# Patient Record
Sex: FEMALE | Race: WHITE
[De-identification: names, ages, dates, MRNs, and addresses within clinical notes are randomized per-mention and may not be internally consistent; named-entity substitution may affect disease eponyms.]

---

## 2017-03-16 ENCOUNTER — HOSPITAL ENCOUNTER (OUTPATIENT)
Dept: HOSPITAL 39 - GMAM | Age: 77
Discharge: HOME | End: 2017-03-16
Attending: FAMILY MEDICINE
Payer: MEDICARE

## 2017-03-16 DIAGNOSIS — E53.8: Primary | ICD-10-CM

## 2018-08-31 ENCOUNTER — HOSPITAL ENCOUNTER (OUTPATIENT)
Dept: HOSPITAL 39 - GMAM | Age: 78
End: 2018-08-31
Attending: FAMILY MEDICINE
Payer: MEDICARE

## 2018-08-31 DIAGNOSIS — E53.8: Primary | ICD-10-CM

## 2018-09-10 ENCOUNTER — HOSPITAL ENCOUNTER (OUTPATIENT)
Dept: HOSPITAL 39 - MAMMO | Age: 78
End: 2018-09-10
Attending: FAMILY MEDICINE
Payer: MEDICARE

## 2018-09-10 DIAGNOSIS — Z12.31: Primary | ICD-10-CM

## 2018-09-11 NOTE — MAM
EXAM DESCRIPTION: 

3D Screening BILATERAL : Digital Mammography.



CLINICAL HISTORY: 

78 years Female SCREENING . No personal history or family history

of breast cancer. Childbirth. Postmenopausal. No HRT.  Lifetime

risk of developing breast cancer (Tyrer-Cuzick model) is 2.8 %.



COMPARISON: 

2-D digital screening bilateral study 10/3/2016.  



TECHNIQUE: 

Bilateral CC and MLO projection full-field images, Digital

tomosynthesis mammographic technique.  Bilateral digital 2-D

full-field MLO images.  CAD not utilized. 



FINDINGS: 

The breast parenchymal density pattern is:  Scattered areas of

fibroglandular density.  No skin thickening or nipple retraction.

 Bilateral axillary lymph nodes. Bilateral solitary

microcalcifications. Skin calcifications on the medial right

breast posteriorly. Secretory calcifications right breast.

No new focal, stellate mass or density, focal asymmetry , and no

suspicious microcalcifications bilaterally. Stable mammograms

compared to prior study.  Taking into account, differences in

mammographic technique.



IMPRESSION: 

Benign exam.



BIRAD CATEGORY: 2 BENIGN FINDINGS.



RECOMMENDATIONS:

FOLLOW UP: Routine digital bilateral  screening, one year

interval from  September 2018.



Written communication explaining the IMPRESSION and follow-up,

will be mailed to the patient and referring health care provider.



According to the [employer] College [employer], yearly mammograms

are recommended starting at age 40 and continuing as long as a

woman is in good health.  Any breast change noted on a breast

self-exam should be reported promptly to the patient's healthcare

provider.  Breast MRI is recommended for women with an

approximately 20-25% or greater lifetime risk of breast cancer,

including women with a strong family history of breast or ovarian

cancer and women who have been treated for Hodgkin's disease.



A negative mammographic report should not delay tissue diagnosis

in patients with significant clinical history or physical

findings.



Extremely dense breast tissue limits the sensitivity of digital

mammography. 



Electronically signed by:  Drew Sargent MD  9/11/2018 9:18 AM CDT

Workstation: [id number]

## 2018-11-12 ENCOUNTER — HOSPITAL ENCOUNTER (OUTPATIENT)
Dept: HOSPITAL 39 - US | Age: 78
End: 2018-11-12
Attending: FAMILY MEDICINE
Payer: MEDICARE

## 2018-11-12 DIAGNOSIS — R10.11: Primary | ICD-10-CM

## 2018-11-12 NOTE — US
EXAM DESCRIPTION: Gall Bladder



CLINICAL HISTORY: ABDOMINAL PAIN



COMPARISON: None Available.



TECHNIQUE: Right upper quadrant ultrasound



FINDINGS:



Pancreas: Visualized portions of the pancreas are unremarkable.

Bowel gas obscures some areas.



Aorta/inferior vena cava: No aortic aneurysm. Normal inferior

vena cava.



Liver: The liver is homogeneous in texture with normal

echogenicity of the hepatic parenchyma. No focal liver lesion or

intrahepatic bile duct dilatation. No liver surface irregularity.

Normal appearance of the portal vein and hepatic veins.



Gallbladder: Gallbladder appears normal  with no intraluminal

stones or wall thickening. 



Common bile duct: Normal caliber measuring 2.6 mm.



Right kidney: Renal length is 11.4 cm. There is mild cortical

thinning but normal echogenicity. No hydronephrosis is seen. No

renal mass or shadowing calculus. Small cyst at the upper pole

the right kidney measures 1.3 cm.



IMPRESSION:



No diagnostic abnormality is identified on sonographic

examination of the right upper quadrant.







Electronically signed by:  Tommy Santoyo MD  11/12/2018 8:53

AM CST Workstation: [id number]

## 2018-12-10 ENCOUNTER — HOSPITAL ENCOUNTER (OUTPATIENT)
Dept: HOSPITAL 39 - GMAM | Age: 78
End: 2018-12-10
Attending: FAMILY MEDICINE
Payer: MEDICARE

## 2018-12-10 DIAGNOSIS — E53.8: Primary | ICD-10-CM

## 2019-02-26 ENCOUNTER — HOSPITAL ENCOUNTER (EMERGENCY)
Dept: HOSPITAL 39 - ER | Age: 79
Discharge: HOME | End: 2019-02-26
Payer: MEDICARE

## 2019-02-26 VITALS — SYSTOLIC BLOOD PRESSURE: 118 MMHG | TEMPERATURE: 98.1 F | DIASTOLIC BLOOD PRESSURE: 90 MMHG

## 2019-02-26 VITALS — OXYGEN SATURATION: 97 %

## 2019-02-26 DIAGNOSIS — I10: ICD-10-CM

## 2019-02-26 DIAGNOSIS — M48.54XA: ICD-10-CM

## 2019-02-26 DIAGNOSIS — K59.01: ICD-10-CM

## 2019-02-26 DIAGNOSIS — E11.9: ICD-10-CM

## 2019-02-26 DIAGNOSIS — R10.30: ICD-10-CM

## 2019-02-26 DIAGNOSIS — K44.9: Primary | ICD-10-CM

## 2019-02-26 DIAGNOSIS — Z90.49: ICD-10-CM

## 2019-02-26 DIAGNOSIS — R11.0: ICD-10-CM

## 2019-02-26 PROCEDURE — 85025 COMPLETE CBC W/AUTO DIFF WBC: CPT

## 2019-02-26 PROCEDURE — 71045 X-RAY EXAM CHEST 1 VIEW: CPT

## 2019-02-26 PROCEDURE — 82550 ASSAY OF CK (CPK): CPT

## 2019-02-26 PROCEDURE — 74176 CT ABD & PELVIS W/O CONTRAST: CPT

## 2019-02-26 PROCEDURE — 80048 BASIC METABOLIC PNL TOTAL CA: CPT

## 2019-02-26 PROCEDURE — 82553 CREATINE MB FRACTION: CPT

## 2019-02-26 PROCEDURE — 85610 PROTHROMBIN TIME: CPT

## 2019-02-26 PROCEDURE — 36415 COLL VENOUS BLD VENIPUNCTURE: CPT

## 2019-02-26 PROCEDURE — 80076 HEPATIC FUNCTION PANEL: CPT

## 2019-02-26 PROCEDURE — 85730 THROMBOPLASTIN TIME PARTIAL: CPT

## 2019-02-26 PROCEDURE — 84484 ASSAY OF TROPONIN QUANT: CPT

## 2019-02-26 NOTE — RAD
EXAM DESCRIPTION: 



Chest,1 View



CLINICAL HISTORY: 



nausea



COMPARISON: 



25 September 2018



TECHNIQUE: 



AP portable chest



FINDINGS: 



Basilar atelectatic type infiltrate is observed bilaterally.

There is evidence of a hiatus hernia. A plate and orthopedic

screws are observed in the right shoulder. No pleural fluid is

identified. The heart is within range of normal.



IMPRESSION: 





1. Basilar atelectatic type infiltrate is observed bilaterally.

2. Large height is hernia is observed.



Electronically signed by:  Lawson Lewis MD  2/26/2019 5:39 PM

[facility] Workstation: [id number]

## 2019-02-26 NOTE — CT
EXAM DESCRIPTION: Abdoment/Pelvis w/o Contrast



CLINICAL HISTORY: 78 years Female, pain



COMPARISON: Gallbladder ultrasound dated November 12, 2018.



TECHNIQUE: Contiguous 3 mm axial images were obtained from the

lung bases to the level of proximal femora without the

administration of intravenous or oral contrast. Sagittal and

coronal reconstructions were reviewed.



FINDINGS: 



Imaged lower thorax appears normal.



Limited evaluation of the solid organs due to the lack of

intravenous contrast. The liver, gallbladder, pancreas and spleen

appear normal. Bilateral adrenal adenomas are identified

measuring 1.7 cm on the right and 1.6 cm on the left. Both

kidneys are symmetric in size and contour with no hydronephrosis

or nephrolithiasis or perinephric fluid collections.



Large paraesophageal hernia is noted. A few paraesophageal lymph

nodes measuring up to 1 cm are identified. The stomach is not

well-distended limiting detailed evaluation. The visualized small

bowel loops appear normal. Large amount of fecal material is

noted within the colon, consistent with constipation. The distal

descending colon and rectum are not well-distended limiting

detailed evaluation. No free intraperitoneal air or fluid.



The urinary bladder is mildly distended with no gross

abnormality. The uterus and ovaries appear normal.



The abdominal aorta demonstrates minimal atherosclerosis. The

inferior vena cava is normal in size and caliber. No abnormally

enlarged lymph nodes are identified.



Moderate degenerative changes are identified throughout the

visualized spine. Lucency is identified along the posterior

aspect of T11 vertebral body with mild superior endplate

compression. Findings are suspicious for acute compression

fracture with questionable underlying metastasis. Increased

sclerosis is identified and T9 and T10 vertebral bodies which is

also suspicious for metastasis.



IMPRESSION: 

1. Large paraesophageal hernia is identified with circumferential

wall thickening. Few paraesophageal lymph nodes measuring up to 1

cm are identified. Upper GI endoscopy is recommended to exclude

the possibility of an underlying mass.

2. Constipation.

3. Lucency is noted in the posterior aspect of T11 vertebral body

with mild superior endplate compression. Findings are suspicious

for acute compression fracture with possible underlying

metastasis.

4. Increased sclerosis is noted in T9 and T10 vertebral bodies,

also suspicious for metastasis.



This exam was performed according to our departmental

dose-optimization program, which includes automated exposure

control, adjustment of the mA and/or kV according to patient size

and/or use of iterative reconstruction technique.





Electronically signed by:  Gregory Miner MD  2/26/2019

5:20 PM [facility] Workstation: [id number]

## 2019-02-26 NOTE — ED.PDOC
History of Present Illness





- General


Chief Complaint: Abdominal Pain


Stated Complaint: Abdominal pain


Time Seen by Provider: 02/26/19 16:22


Information Source: patient


Exam Limitations: no limitations





- History of Present Illness


Initial Comments: 





Kell Pulido 77 y/o female came to ER with dull constant abdominal pain since 

Sunday,her last BM was Friday took MOM and dulcolax but unable to move bowels 

,had also been feeling nauseated .No vomiting.


Abdominal Pain Onset Location: generalized abdomen


Pain Radiation: back


Quality: moderate, aching, steady


Timing/Duration: other - 3 days


Improving Factors: nothing


Worsening Factors: nothing


Associated Symptoms: other - loss of appetite





Review of Systems





- Review of Systems


Constitutional: States: no symptoms reported


EENTM: States: no symptoms reported


Respiratory: States: no symptoms reported


Cardiology: States: no symptoms reported


Gastrointestinal/Abdominal: States: see HPI


Genitourinary: States: no symptoms reported


Musculoskeletal: States: no symptoms reported


All other Systems: Reviewed and Negative, No Change from Baseline





Past Medical History (General)





- Patient Medical History


Hx Seizures: No


Hx Stroke: No


Hx Dementia: No


Hx Asthma: No


Hx of COPD: No


Hx Cardiac Disorders: No


Hx Congestive Heart Failure: No


Hx Pacemaker: No


Hx Hypertension: Yes


Hx Thyroid Disease: No


Hx Diabetes: Yes


Hx Gastroesophageal Reflux: No


Hx Renal Disease: No


Hx Cancer: No


Hx of HIV: No


Hx Hepatitis C: No


Hx MRSA: No


Surgical History: appendectomy, other - c- section,bladder 

tuck,wrist,shoulder,colonoscopy





- Social History


Hx Tobacco Use: No


Hx Alcohol Use: No


Hx Substance Use: No


Hx Substance Use Treatment: No


Hx Depression: No





- Female History


Patient is a Female of Child Bearing Age (10 -59 yrs old): No





- Triage Comment


ED Triage Comment: Constipation since Friday, not eaten since sunday and OTC 

meds did not help with constipation. The abdominal pain moved from one area to 

another in the abdomen and back.





Family Medical History





- Family History


  ** Mother


Family History: Unknown


Living Status: Unknown


Hx Family Diabetes: Yes - parents





Physical Exam





- Physical Exam


General Appearance: Alert, Comfortable, No apparent distress


Eyes, Ears, Nose, Throat Exam: PERRL/EOMI, normal ENT inspection


Neck: non-tender, full range of motion, supple, normal inspection


Respiratory: chest non-tender, lungs clear, normal breath sounds, no respiratory

distress


Cardiovascular/Chest: normal peripheral pulses, regular rate, rhythm, no murmur


Peripheral Pulses: No deficit


Gastrointestinal/Abdominal: non tender, soft


Back Exam: no CVA tenderness, no vertebral tenderness


Extremity: no pedal edema, no calf tenderness


Neurologic: alert, oriented x 3


Skin Exam: other - no rashes





Progress





- Progress


Progress: 





02/26/19 16:39


                               Vital Signs - 8 hr











  02/26/19





  16:03


 


Temperature 98.6 F


 


Pulse Rate [ 108 H





Left Radial] 


 


Respiratory 18





Rate 


 


Blood Pressure 143/89





[Right Arm] 


 


O2 Sat by Pulse 97





Oximetry 














- Results/Orders


Results/Orders: 





                                Laboratory Tests











  02/26/19





  15:51


 


WBC  10.8


 


RBC  4.58


 


Hgb  12.4


 


Hct  38.0


 


MCV  83.1


 


MCH  27.1


 


MCHC  32.6 L


 


RDW  17.2 H


 


Plt Count  357


 


MPV  7.7


 


Absolute Neuts (auto)  7.70 H


 


Absolute Lymphs (auto)  1.60


 


Absolute Monos (auto)  1.40 H


 


Absolute Eos (auto)  0.00


 


Absolute Basos (auto)  0.10


 


Neutrophils %  71.2


 


Lymphocytes %  14.8 L


 


Monocytes %  13.1 H


 


Eosinophils %  0.1 L


 


Basophils %  0.8


 


PT  11.0 H


 


INR  1.10


 


PTT (SP)  26.8


 


Sodium  135


 


Potassium  4.2


 


Chloride  99 L


 


Carbon Dioxide  20 L


 


Anion Gap  20.2 H


 


BUN  17


 


Creatinine  0.58 L


 


BUN/Creatinine Ratio  29.3 H


 


Random Glucose  144 H


 


Serum Osmolality  274.2 L


 


Calcium  9.5


 


Magnesium  1.8


 


Total Bilirubin  1.0


 


Direct Bilirubin  0.2


 


Indirect Bilirubin  0.8


 


AST  28


 


ALT  32


 


Alkaline Phosphatase  144 H


 


Creatine Kinase  52


 


CK-MB (CK-2)  1.2


 


CK-MB (CK-2) %  2.31


 


Troponin I  < 0.02


 


Serum Total Protein  8.9 H


 


Albumin  4.0








Discuss all test results with patient





- EKG/XRAY/CT


XRAY: chest - basilar atelectasis,hiatal hernia


CT Ordered: Yes - paraesophageal hernia with concentric wall 

thickening;compressed fracture





Departure





- Departure


Clinical Impression: 


 Paraesophageal hernia, Constipation by delayed colonic transit





Abdominal pain


Qualifiers:


 Abdominal location: lower abdomen, unspecified Qualified Code(s): R10.30 - 

Lower abdominal pain, unspecified





Compression fracture of thoracic spine, non-traumatic


Qualifiers:


 Encounter type: initial encounter Thoracic vertebra fracture level: T11 

Qualified Code(s): M48.54XA - Collapsed vertebra, not elsewhere classified, 

thoracic region, initial encounter for fracture





Time of Disposition: 19:12


Disposition: Discharge to Home or Self Care


Condition: Fair


Departure Forms:  ED Discharge - Pt. Copy, Patient Portal Self Enrollment


Referrals: 


Boby Funez MD [Primary Care Provider] - 1-2 Weeks


Prescriptions: 


Acetaminophen W/ Codeine [Tylenol w/Codeine 300-30 mg] 1 tab PO Q6HRS PRN #30 

tab


 PRN Reason: Pain


Home Medications: 


Ambulatory Orders





Acetaminophen W/ Codeine [Tylenol w/Codeine 300-30 mg] 1 tab PO Q6HRS PRN #30 

tab 02/26/19 








Additional Instructions: 


Follow up with primary Md in AM 27 Feb 2019;Return to ER if symptoms worsens

## 2019-02-27 ENCOUNTER — HOSPITAL ENCOUNTER (OUTPATIENT)
Dept: HOSPITAL 39 - GMAHI | Age: 79
End: 2019-02-27
Attending: NURSE PRACTITIONER
Payer: MEDICARE

## 2019-02-27 DIAGNOSIS — R11.2: Primary | ICD-10-CM

## 2019-03-05 ENCOUNTER — HOSPITAL ENCOUNTER (OUTPATIENT)
Dept: HOSPITAL 39 - CT | Age: 79
End: 2019-03-05
Attending: NURSE PRACTITIONER
Payer: MEDICARE

## 2019-03-05 DIAGNOSIS — R91.8: ICD-10-CM

## 2019-03-05 DIAGNOSIS — K44.9: Primary | ICD-10-CM

## 2019-03-05 DIAGNOSIS — K59.01: ICD-10-CM

## 2019-03-05 DIAGNOSIS — S22.080S: ICD-10-CM

## 2019-03-05 DIAGNOSIS — R11.2: ICD-10-CM

## 2019-03-05 NOTE — CT
EXAM DESCRIPTION: 

Chest w/wo Contrast



CLINICAL HISTORY: 

WEDGE COMPRESSION FRACTURE OF T11-T12 VERTEBRA



COMPARISON: 

CT abdomen pelvis February 26, 2019



TECHNIQUE: 

Pre and postcontrast CT images of the chest are obtained using

standard imaging protocol. This exam was performed according to

our departmental dose-optimization program, which includes

automated exposure control, adjustment of the mA and/or kV

according to patient size and/or use of iterative reconstruction

technique .



FINDINGS: 

Images are moderately degraded by patient breathing motion

artifact.

The heart is enlarged. Mild coronary artery calcifications are

partly visualized. Thoracic aorta is unremarkable. Bovine origin

of the great vessels from the aortic arch are noted.



No pathologically enlarged mediastinal, hilar, or axillary

lymphadenopathy is seen.



Trace right pleural effusion is seen.



Moderate retrocardiac hiatal hernia is seen.

Liver is enlarged with heterogeneous decreased attenuation

consistent with diffuse fatty infiltration.



Bilateral adrenal gland adenomas are again seen measuring 2.2 cm

on the right hand 2 cm x 1.3 cm on the left with Hounsfield units

less than 5. No further imaging follow-up is recommended. Several

lymph nodes in the epigastric region are again seen measuring

maximum 9 mm short axis. Please see patient and discussion from

previous CT of the abdomen and pelvis.



Lungs are mildly hyperinflated.

Geographic areas of groundglass attenuation in the lungs are seen

bilaterally with areas of interstitial thickening in the lower

lobes and right middle lobe.

Several pleural-based noncalcified pulmonary nodules are seen

measuring less then 5 mm. Ill-defined 5.5 mm pulmonary nodule in

the medial left upper lobe image 28 of series 4 is seen.



Osseous structures are diffusely osteopenic. Postsurgical changes

are seen in the incompletely visualized right humeral head.



Diffuse mostly sclerotic changes are seen throughout the T9 and

T10 vertebral bodies extending to the posterior elements at T11.

Destructive changes of the posterior inferior endplate of T11 is

seen.

Bone destructive changes and lytic lesion involving the mid to

posterior aspect of the T12 vertebral body asymmetric towards the

right extending to the right transverse process is seen with loss

of the normal posterior cortex. Vertically oriented fracture

through the central aspect of the vertebral body is seen with

mild anterior wedging of the superior plate and mild concavity of

the inferior. At least mild spinal canal stenosis is seen.

Sclerotic changes involving the medial right 10th rib are seen.



Mild disc degenerative changes and facet arthropathy of the

thoracic spine above this level is seen.



IMPRESSION: 

Abnormal appearance of the T10-T12 vertebral bodies concerning

for metastatic disease. Atypical presentation of discitis

osteomyelitis at T11-12 is also a consideration. Pre and

postcontrast MRI of the thoracic spine may be useful. Recommend

tissue sampling and biopsy.



Lytic lesion and bone destructive changes involving the T12

vertebral body extending to the transverse process contributes to

at least mild to moderate spinal canal stenosis and right

foraminal encroachment.



Pathologic compression fracture deformity at T12 is seen.



Areas of groundglass attenuation in the lungs suggesting air

trapping and probable sequela of chronic lung disease with mild

interstitial scarring or atelectasis in the lower lung fields.



Trace right pleural effusion is seen.



Several less than 6 mm noncalcified pulmonary nodules are seen.

Recommend follow-up CT imaging of the chest in one year to

document long-term stability.



Moderate retrocardiac hiatal hernia is seen.



Other findings as described in body of the report.





Electronically signed by:  Lico Rodriguez MD  3/5/2019 1:32 PM [facility]

Workstation: [id number]

## 2019-04-11 ENCOUNTER — HOSPITAL ENCOUNTER (OUTPATIENT)
Dept: HOSPITAL 39 - AMB | Age: 79
Discharge: HOME | End: 2019-04-11
Attending: SURGERY
Payer: MEDICARE

## 2019-04-11 VITALS — DIASTOLIC BLOOD PRESSURE: 69 MMHG | TEMPERATURE: 97 F | SYSTOLIC BLOOD PRESSURE: 126 MMHG | OXYGEN SATURATION: 97 %

## 2019-04-11 DIAGNOSIS — C79.51: ICD-10-CM

## 2019-04-11 DIAGNOSIS — Z88.5: ICD-10-CM

## 2019-04-11 DIAGNOSIS — E11.9: ICD-10-CM

## 2019-04-11 DIAGNOSIS — C78.7: ICD-10-CM

## 2019-04-11 DIAGNOSIS — Z79.84: ICD-10-CM

## 2019-04-11 DIAGNOSIS — I10: ICD-10-CM

## 2019-04-11 DIAGNOSIS — Z79.82: ICD-10-CM

## 2019-04-11 DIAGNOSIS — C78.89: Primary | ICD-10-CM

## 2019-04-11 DIAGNOSIS — C78.00: ICD-10-CM

## 2019-04-11 DIAGNOSIS — Z79.899: ICD-10-CM

## 2019-04-11 PROCEDURE — 76000 FLUOROSCOPY <1 HR PHYS/QHP: CPT

## 2019-04-11 PROCEDURE — 71045 X-RAY EXAM CHEST 1 VIEW: CPT

## 2019-04-11 PROCEDURE — 36415 COLL VENOUS BLD VENIPUNCTURE: CPT

## 2019-04-11 PROCEDURE — 00532 ANES ACCESS CTR VENOUS CRCJ: CPT

## 2019-04-11 PROCEDURE — 36416 COLLJ CAPILLARY BLOOD SPEC: CPT

## 2019-04-11 PROCEDURE — 71046 X-RAY EXAM CHEST 2 VIEWS: CPT

## 2019-04-11 PROCEDURE — 36561 INSERT TUNNELED CV CATH: CPT

## 2019-04-11 PROCEDURE — 93005 ELECTROCARDIOGRAM TRACING: CPT

## 2019-04-11 PROCEDURE — 85025 COMPLETE CBC W/AUTO DIFF WBC: CPT

## 2019-04-11 PROCEDURE — 82948 REAGENT STRIP/BLOOD GLUCOSE: CPT

## 2019-04-11 PROCEDURE — 80048 BASIC METABOLIC PNL TOTAL CA: CPT

## 2019-04-11 PROCEDURE — 81001 URINALYSIS AUTO W/SCOPE: CPT

## 2019-04-11 NOTE — RAD
EXAM DESCRIPTION: Chest,2 Views



CLINICAL HISTORY: PREOP



COMPARISON: None



TECHNIQUE: PA/lateral



FINDINGS: Orthopedic hardware in the right proximal humerus.

Density behind the heart consistent with a small to moderate

hiatal hernia. Heart size is normal with normal pulmonary

vascularity. No pleural effusion or pneumothorax. Linear scarring

or discoid atelectasis in the perihilar regions. Right

hemidiaphragm is elevated. Lungs are otherwise clear with no

consolidating infiltrate. Lateral view shows intact sternum and

T-spine.



IMPRESSION:



No acute process is identified in the chest.



Electronically signed by:  Tommy Santoyo MD  4/11/2019 9:39

AM CDT Workstation: [id number]

## 2019-04-11 NOTE — OP
DATE OF PROCEDURE:     04/11/19



PREOPERATIVE DIAGNOSIS: 

1.   Metastatic gastric carcinoma.



POSTOPERATIVE DIAGNOSIS: 

1.   Metastatic gastric carcinoma.



SURGICAL PROCEDURE: 

1.   Insertion right subclavian venous access port.



SURGEON:  Donald A. Behr, M.D.



ASSISTANT:  None.



ANESTHESIA:  Local infiltration of 1% Lidocaine and general room air anesthesia 
by Anesthesia.



INDICATION FOR SURGERY:  The patient is a 78 year-old female who has had some 
difficulty with swallowing and was found to have a carcinoma of the stomach.  
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  She has recently
undergone radiation therapy and is taking Medrol for that.  She has an elevated 
blood sugar but has been brought to the Operating Room for insertion of a right 
subclavian venous access port using fluoroscopy for insertion of the guidewire.



FINDINGS AT TIME OF PROCEDURE:  The guidewire and then the catheter were 
identified in the superior vena cava.  Post procedure chest x-ray is pending to 
rule out pneumothorax.



DESCRIPTION OF PROCEDURE:  After the patient was brought to the Surgical Suite 
and placed in the supine position, she was pepped on both sides of the chest and
draped.  A surgical time out was taken.  At this point the infraclavicular area 
on the right was infiltrated with local anesthesia and taken several passes the 
22 gauge needle was introduced into below the clavicle.  Venous blood was 
aspirated.  At this point the 18 gauge thin-walled needle was introduced in the 
same direction with 1 stick and blood was aspirated.  The guidewire was 
introduced and advanced to approximately 30 cm with no ectopy.  At this point a 
towel was placed over the field and the fluoroscopy unit was used to identify 
the guidewire in the superior vena cava.  We then removed the towel.  A port 
pocket was formed using local anesthesia and a sharp knife, and the blunt 
dissection with electrocautery.  The port was introduced into the port pocket 
and sutured in place with #3-0 Prolene simple sutures.  The catheter is tunneled
from the port pocket to the insertion site and then cut to the appropriate 
length.  The dilator introducer is introduced over the guidewire.  The guidewire
and introducer were removed.  The catheter was introduced through the introducer
and the introducer was removed in the usual manner.  When this was done, the 
port is accessed with a Razo needle.  Blood is easily aspirated.  It was 
flushed with Heparinized saline and then was hep-locked.  When this was done, it
is de-accessed.  The subcutaneous tissue of the port pocket is reapproximated 
with interrupted #3-0 Vicryl sutures.  The skin edges are then approximated with
#4-0 Vicryl subcuticular stitches with Benzoin and Steri-Strips.  Sterile 
dressings were applied.  The patient tolerated the procedure well.  Estimated 
blood loss was approximately 25 mL.  The patient was returned to the Recovery 
Room in stable condition.  I ordered a stat portable chest x-ray.



#[id number]

[facility]

## 2019-04-11 NOTE — RAD
EXAM DESCRIPTION: 



Chest,1 View



CLINICAL HISTORY: 



PORT PLACEMENT VERIFICATION 



COMPARISON: 



Chest radiograph obtained earlier same day



FINDINGS: 



There has been interval placement of a right-sided Mediport

device without apparent complication. The tip of the

portacatheter projects over the SVC near the cavoatrial junction.

The cardiomediastinal silhouette is unremarkable. There is no

airspace consolidation or pleural effusion. The bronchovascular

markings are within normal limits, and the lungs are not

hyperinflated. There is no pneumothorax or acute fracture.

Postoperative changes are noted in the right shoulder.



IMPRESSION: 



Right-sided Mediport device in place as detailed above without

apparent complication.



Electronically signed by:  Connor Evangelista MD  4/11/2019 12:00 PM CDT

Workstation: [id number]

## 2019-04-12 NOTE — RAD
EXAM: FL LESS THAN 1 HOUR



DATE: 4/11/2019



Ordering provider: Donald A Behr, MD



CLINICAL INDICATION: PORT PLACEMENT 



COMPARISON: None.



FINDINGS:

1 intraoperative fluoroscopic image  submitted for review. Image

demonstrates placement of a right-sided port with tip extending

to the distal SVC. Please refer to operative report for details. 

Fluoroscopy time: 4.3 seconds.



IMPRESSION:

Intraoperative fluoroscopy.



Electronically signed by:  Jorge Luis Deras MD  4/12/2019 8:39 AM CDT

Workstation: [id number]

## 2019-04-26 ENCOUNTER — HOSPITAL ENCOUNTER (OUTPATIENT)
Dept: HOSPITAL 39 - LAB.O | Age: 79
End: 2019-04-26
Attending: INTERNAL MEDICINE
Payer: MEDICARE

## 2019-04-26 DIAGNOSIS — M89.9: ICD-10-CM

## 2019-04-26 DIAGNOSIS — R93.89: ICD-10-CM

## 2019-04-26 DIAGNOSIS — C15.5: Primary | ICD-10-CM

## 2019-07-08 ENCOUNTER — HOSPITAL ENCOUNTER (OUTPATIENT)
Dept: HOSPITAL 39 - CT | Age: 79
End: 2019-07-08
Attending: INTERNAL MEDICINE
Payer: MEDICARE

## 2019-07-08 DIAGNOSIS — J90: ICD-10-CM

## 2019-07-08 DIAGNOSIS — M51.84: ICD-10-CM

## 2019-07-08 DIAGNOSIS — M51.86: ICD-10-CM

## 2019-07-08 DIAGNOSIS — C15.5: Primary | ICD-10-CM

## 2019-07-08 DIAGNOSIS — M89.9: ICD-10-CM

## 2019-07-08 DIAGNOSIS — R91.8: ICD-10-CM

## 2019-07-08 DIAGNOSIS — K66.8: ICD-10-CM

## 2019-07-08 DIAGNOSIS — K76.9: ICD-10-CM

## 2019-07-08 NOTE — CT
EXAM DESCRIPTION: Chest w/Contrast (accession [id number]),

Abdomen w/Contrast (accession [id number])



CLINICAL HISTORY: 78 years Female, MALIGNANT NEOPLASM OF LOWER

THIRD OF ESOPHAGUS



COMPARISON: CT abdomen and pelvis dated 2/26/2019.



TECHNIQUE: Contiguous thin section axial images through the chest

and abdomen were obtained after  the administration of

intravenous contrast. Sagittal and coronal reconstructions were

reviewed.



FINDINGS: 5 mm hypodense lesion is identified in the right

thyroid lobe. The visualized supraclavicular regions appear

grossly unremarkable. No evidence of abnormally enlarged

mediastinal, hilar or axillary lymphadenopathy.



Trachea is midline and the central tracheobronchial tree is

patent. Moderate to large right and moderate-sized left pleural

effusions are identified. Consolidative airspace opacities are

noted in the bilateral lower lobes, lingula and right middle

lobe. Multiple noncalcified soft tissue nodules are identified in

the aerated portions of the lungs the largest measuring 7 mm in

the right upper lobe on image #[id number]. These are suspicious for

metastasis.



The heart is normal in size with no pericardial effusion. The

visualized aorta is nonaneurysmal with mild atherosclerosis. The

superior vena cava is normal in size and caliber. Mild coronary

artery atherosclerosis. The esophagus appears normal throughout

its visualized length.





Multiple hypodense lesions are identified throughout the liver

the largest measuring 3 cm in the dome in hepatic segment 7/8.

Findings are suspicious for metastasis. The gallbladder is

adequately distended with no gross abnormality. The pancreas,

spleen appear normal. Indeterminate bilateral adrenal nodules

measuring 1.7 cm on each side are identified. 1.2 cm hypodense

lesion is noted in the upper pole of the right kidney. No

hydronephrosis or perinephric fluid collections bilaterally.



Moderate-sized hiatal hernia is noted. The stomach is not

well-distended limiting detailed evaluation. The visualized small

and large bowel loops demonstrate no gross abnormality.



Mild atherosclerotic disease is noted in the visualized aorta.

The inferior vena cava is normal in size and caliber. No

abnormally enlarged lymph nodes are identified in the

retroperitoneum. Multiple peritoneal soft tissue nodules are

identified in the mid and right hemiabdomen, concerning for

metastasis.



Mixed lytic and sclerosis is identified along the inferior

endplate of T8 vertebral body, entire T9 and T10 vertebral bodies

with no loss of height. The T11 vertebral body demonstrates mixed

lytic and sclerotic changes with cortical disruption of the

endplates and compression deformity of approximately 40% loss of

height. Findings are consistent with metastasis. Ill-defined area

of sclerosis is also identified in L2 vertebral body and along

the endplates of L4 and L5 vertebral bodies.



IMPRESSION:



1. Moderate to large right and moderate left pleural effusions

are identified with consolidative airspace opacities in the

bilateral lower lobes, lingula and right middle lobe.

2. Multiple noncalcified soft tissue nodules are identified

throughout both lungs the largest measuring 7 mm in the right

upper lobe. Findings are suspicious for metastasis.

3. Multiple hypodense lesions are identified throughout the liver

the largest measuring 3 cm in the dome. Findings are suspicious

for metastasis.

4. Multiple peritoneal deposits are also identified in the

midabdomen and right hemiabdomen, concerning for metastasis.

5. Indeterminate bilateral adrenal nodules are also suspicious

for metastasis.

6. Mixed lytic and sclerotic lesions are identified in T8, T9,

T10, T11 and L2 vertebral bodies, most likely representing

metastasis. There is evidence of cortical disruption of the

endplates of T11 vertebral body with compression deformity of

approximately 40% loss of height.



This exam was performed according to our departmental

dose-optimization program, which includes automated exposure

control, adjustment of the mA and/or kV according to patient size

and/or use of iterative reconstruction technique.



Electronically signed by:  Kimmie Shin MD  7/8/2019 11:24 AM

CDT Workstation: [id number]

## 2019-07-08 NOTE — CT
EXAM DESCRIPTION: Chest w/Contrast (accession [id number]),

Abdomen w/Contrast (accession [id number])



CLINICAL HISTORY: 78 years Female, MALIGNANT NEOPLASM OF LOWER

THIRD OF ESOPHAGUS



COMPARISON: CT abdomen and pelvis dated 2/26/2019.



TECHNIQUE: Contiguous thin section axial images through the chest

and abdomen were obtained after  the administration of

intravenous contrast. Sagittal and coronal reconstructions were

reviewed.



FINDINGS: 5 mm hypodense lesion is identified in the right

thyroid lobe. The visualized supraclavicular regions appear

grossly unremarkable. No evidence of abnormally enlarged

mediastinal, hilar or axillary lymphadenopathy.



Trachea is midline and the central tracheobronchial tree is

patent. Moderate to large right and moderate-sized left pleural

effusions are identified. Consolidative airspace opacities are

noted in the bilateral lower lobes, lingula and right middle

lobe. Multiple noncalcified soft tissue nodules are identified in

the aerated portions of the lungs the largest measuring 7 mm in

the right upper lobe on image #[id number]. These are suspicious for

metastasis.



The heart is normal in size with no pericardial effusion. The

visualized aorta is nonaneurysmal with mild atherosclerosis. The

superior vena cava is normal in size and caliber. Mild coronary

artery atherosclerosis. The esophagus appears normal throughout

its visualized length.





Multiple hypodense lesions are identified throughout the liver

the largest measuring 3 cm in the dome in hepatic segment 7/8.

Findings are suspicious for metastasis. The gallbladder is

adequately distended with no gross abnormality. The pancreas,

spleen appear normal. Indeterminate bilateral adrenal nodules

measuring 1.7 cm on each side are identified. 1.2 cm hypodense

lesion is noted in the upper pole of the right kidney. No

hydronephrosis or perinephric fluid collections bilaterally.



Moderate-sized hiatal hernia is noted. The stomach is not

well-distended limiting detailed evaluation. The visualized small

and large bowel loops demonstrate no gross abnormality.



Mild atherosclerotic disease is noted in the visualized aorta.

The inferior vena cava is normal in size and caliber. No

abnormally enlarged lymph nodes are identified in the

retroperitoneum. Multiple peritoneal soft tissue nodules are

identified in the mid and right hemiabdomen, concerning for

metastasis.



Mixed lytic and sclerosis is identified along the inferior

endplate of T8 vertebral body, entire T9 and T10 vertebral bodies

with no loss of height. The T11 vertebral body demonstrates mixed

lytic and sclerotic changes with cortical disruption of the

endplates and compression deformity of approximately 40% loss of

height. Findings are consistent with metastasis. Ill-defined area

of sclerosis is also identified in L2 vertebral body and along

the endplates of L4 and L5 vertebral bodies.



IMPRESSION:



1. Moderate to large right and moderate left pleural effusions

are identified with consolidative airspace opacities in the

bilateral lower lobes, lingula and right middle lobe.

2. Multiple noncalcified soft tissue nodules are identified

throughout both lungs the largest measuring 7 mm in the right

upper lobe. Findings are suspicious for metastasis.

3. Multiple hypodense lesions are identified throughout the liver

the largest measuring 3 cm in the dome. Findings are suspicious

for metastasis.

4. Multiple peritoneal deposits are also identified in the

midabdomen and right hemiabdomen, concerning for metastasis.

5. Indeterminate bilateral adrenal nodules are also suspicious

for metastasis.

6. Mixed lytic and sclerotic lesions are identified in T8, T9,

T10, T11 and L2 vertebral bodies, most likely representing

metastasis. There is evidence of cortical disruption of the

endplates of T11 vertebral body with compression deformity of

approximately 40% loss of height.



This exam was performed according to our departmental

dose-optimization program, which includes automated exposure

control, adjustment of the mA and/or kV according to patient size

and/or use of iterative reconstruction technique.



Electronically signed by:  Kimmie Shin MD  7/8/2019 11:24 AM

CDT Workstation: [id number]

## 2019-07-09 ENCOUNTER — HOSPITAL ENCOUNTER (OUTPATIENT)
Dept: HOSPITAL 39 - NM | Age: 79
End: 2019-07-09
Attending: INTERNAL MEDICINE
Payer: MEDICARE

## 2019-07-09 DIAGNOSIS — R93.89: ICD-10-CM

## 2019-07-09 DIAGNOSIS — C15.5: Primary | ICD-10-CM

## 2019-07-09 DIAGNOSIS — M89.9: ICD-10-CM

## 2019-07-10 NOTE — NM
EXAM DESCRIPTION:

Bone Scan, Whole Body: Nuclear Medicine



CLINICAL HISTORY: 

78 years Female MALIGNANT NEOPLASM OF ESOPHAGUS spine lesions on

PET CT scan.



COMPARISON: 

PET CT scan 3/26/2019. CT scan chest abdomen and pelvis 7/8/2019.



TECHNIQUE: 

Patient injected with 26.6  mCi of technetium 99M MDP  IV. 

Delayed gamma camera images from various planes were obtained 3

hr after injection. 



FINDINGS: 

Abnormal increased activity in the right L4 vertebral body,

somewhat circumscribed. This lesion showed increased uptake on

the PET scan and sclerosis on the abdominal CT scan. Focal

increased activity in the medial aspect of the left anterior

iliac crest lateral to the upper SI joint, also showing increased

uptake on the PET scan. Focal abnormal increased activity in the

right T12 vertebral body more posterior than anterior which can

be seen as a focal sclerotic lesion in the right L1 vertebral

body on the recent CT scan, with increased uptake on PET scan.



 Normal increased activity in the right T11 vertebral body more

posterior than anterior corresponding to increased uptake on the

PET scan and mixed sclerosis on the CT, more on the right. The Tc

99m activity in the T11 vertebral body is decreased compared to

the uptake on the PET scan. Diffuse abnormal increased activity

in the T10 and T9 vertebral bodies corresponding to diffuse

sclerosis in the T10 and T9 vertebral bodies on the CT scan and

diffuse uptake in the vertebral bodies on the PET scan. Abnormal

activity on the bone scan in the bilateral T10 pedicles

corresponds to sclerosis seen on the abdominal CT scan. Activity

in the proximal right posterior ninth rib corresponds to

sclerosis on the CT scan and uptake on the PET scan. Increased

Tech 99m MDP activity in the bilateral medial posterior 10th ribs

more right than left,  seen as sclerosis on the CT scan, and

increased uptake on PET scan.

Physiologic activity in the kidneys, urinary bladder. Injection

site with minimal infiltration left elbow. Minimal activity in

the region of the right knee most likely related to arthrosis.



IMPRESSION:

1. Abnormal radiopharmaceutical activity T9-T10, T11, L1 and L4

in the spine. Also focal abnormal activity in the left posterior

medial iliac crest abutting the SI joint and medial posterior

ribs adjacent to the infiltrated vertebra described above. This

is interpreted to be related to metastatic disease, and is

similar to uptake on the PET CT scan in March 2019. Also

correlates with CT scans performed on July 8, 2019.  



Electronically signed by:  Drew Sargent MD  7/10/2019 3:19 PM CDT

Workstation: [id number]